# Patient Record
Sex: FEMALE | Race: WHITE | Employment: OTHER | URBAN - METROPOLITAN AREA
[De-identification: names, ages, dates, MRNs, and addresses within clinical notes are randomized per-mention and may not be internally consistent; named-entity substitution may affect disease eponyms.]

---

## 2019-06-21 ENCOUNTER — TRANSFERRED RECORDS (OUTPATIENT)
Dept: HEALTH INFORMATION MANAGEMENT | Facility: CLINIC | Age: 79
End: 2019-06-21

## 2019-09-09 ENCOUNTER — OFFICE VISIT (OUTPATIENT)
Dept: OPHTHALMOLOGY | Facility: CLINIC | Age: 79
End: 2019-09-09
Attending: OPHTHALMOLOGY
Payer: MEDICARE

## 2019-09-09 DIAGNOSIS — H20.11 CHRONIC ANTERIOR UVEITIS OF RIGHT EYE: Primary | ICD-10-CM

## 2019-09-09 DIAGNOSIS — H40.1130 PRIMARY OPEN ANGLE GLAUCOMA OF BOTH EYES, UNSPECIFIED GLAUCOMA STAGE: ICD-10-CM

## 2019-09-09 DIAGNOSIS — H25.12 AGE-RELATED NUCLEAR CATARACT OF LEFT EYE: ICD-10-CM

## 2019-09-09 DIAGNOSIS — Z96.1 PSEUDOPHAKIA OF RIGHT EYE: ICD-10-CM

## 2019-09-09 PROBLEM — I10 ESSENTIAL HYPERTENSION: Status: ACTIVE | Noted: 2017-10-16

## 2019-09-09 PROBLEM — I73.9 PAD (PERIPHERAL ARTERY DISEASE) (H): Status: ACTIVE | Noted: 2017-10-16

## 2019-09-09 PROCEDURE — 76513 OPH US DX ANT SGM US UNI/BI: CPT | Mod: ZF | Performed by: OPHTHALMOLOGY

## 2019-09-09 PROCEDURE — 92134 CPTRZ OPH DX IMG PST SGM RTA: CPT | Mod: ZF | Performed by: OPHTHALMOLOGY

## 2019-09-09 PROCEDURE — G0463 HOSPITAL OUTPT CLINIC VISIT: HCPCS | Mod: ZF

## 2019-09-09 RX ORDER — TRAVOPROST OPHTHALMIC SOLUTION 0.04 MG/ML
1 SOLUTION OPHTHALMIC AT BEDTIME
COMMUNITY
Start: 2018-05-01

## 2019-09-09 RX ORDER — CHLORAL HYDRATE 500 MG
1 CAPSULE ORAL
COMMUNITY

## 2019-09-09 RX ORDER — ACETAMINOPHEN 160 MG
2000 TABLET,DISINTEGRATING ORAL
COMMUNITY

## 2019-09-09 RX ORDER — NITROGLYCERIN 0.4 MG/1
TABLET SUBLINGUAL
COMMUNITY
Start: 2018-04-10

## 2019-09-09 RX ORDER — ISOSORBIDE MONONITRATE 30 MG/1
TABLET, EXTENDED RELEASE ORAL
COMMUNITY
Start: 2018-02-24

## 2019-09-09 RX ORDER — VALACYCLOVIR HYDROCHLORIDE 500 MG/1
500 TABLET, FILM COATED ORAL 3 TIMES DAILY
Qty: 42 TABLET | Refills: 1 | Status: SHIPPED | OUTPATIENT
Start: 2019-09-09 | End: 2019-10-14

## 2019-09-09 RX ORDER — VALACYCLOVIR HYDROCHLORIDE 500 MG/1
500 TABLET, FILM COATED ORAL 3 TIMES DAILY
Qty: 42 TABLET | Refills: 1 | Status: SHIPPED | OUTPATIENT
Start: 2019-09-09 | End: 2019-09-09

## 2019-09-09 RX ORDER — TRIAMCINOLONE ACETONIDE 1 MG/G
OINTMENT TOPICAL
COMMUNITY
Start: 2018-05-01

## 2019-09-09 RX ORDER — ACETAMINOPHEN 500 MG
1-2 TABLET ORAL
COMMUNITY

## 2019-09-09 RX ORDER — ALPRAZOLAM 0.25 MG
TABLET ORAL
Refills: 1 | COMMUNITY
Start: 2019-06-13

## 2019-09-09 RX ORDER — ASPIRIN 81 MG/1
81 TABLET ORAL
COMMUNITY
Start: 2015-12-02

## 2019-09-09 RX ORDER — ALPRAZOLAM 0.25 MG
TABLET ORAL
COMMUNITY
Start: 2018-03-03 | End: 2019-10-14

## 2019-09-09 RX ORDER — DORZOLAMIDE HYDROCHLORIDE AND TIMOLOL MALEATE 20; 5 MG/ML; MG/ML
SOLUTION/ DROPS OPHTHALMIC
Refills: 5 | COMMUNITY
Start: 2019-08-23 | End: 2019-10-14

## 2019-09-09 ASSESSMENT — GONIOSCOPY
OD_SUPERIOR: GRADE IV, NO PAS
OS_NASAL: GRADE IV, NO PAS
OS_INFERIOR: GRADE IV, NO PAS
OD_INFERIOR: GRADE IV, NO PAS
OS_TEMPORAL: GRADE IV, NO PAS
OS_SUPERIOR: GRADE IV, NO PAS
OD_NASAL: GRADE IV, NO PAS

## 2019-09-09 ASSESSMENT — VISUAL ACUITY
OD_PH_SC+: -2
METHOD: SNELLEN - LINEAR
OD_SC+: -1
OS_PH_SC+: -2
OS_PH_SC: 20/30
OS_SC+: -1
OD_SC: 20/40
OD_PH_SC: 20/30
OS_SC: 20/50

## 2019-09-09 ASSESSMENT — CONF VISUAL FIELD
METHOD: COUNTING FINGERS
OS_NORMAL: 1
OD_NORMAL: 1

## 2019-09-09 ASSESSMENT — EXTERNAL EXAM - RIGHT EYE: OD_EXAM: NORMAL

## 2019-09-09 ASSESSMENT — TONOMETRY
OS_IOP_MMHG: 19
OD_IOP_MMHG: 19
IOP_METHOD: TONOPEN

## 2019-09-09 ASSESSMENT — EXTERNAL EXAM - LEFT EYE: OS_EXAM: NORMAL

## 2019-09-09 ASSESSMENT — CUP TO DISC RATIO
OS_RATIO: 0.6
OD_RATIO: 0.6

## 2019-09-09 ASSESSMENT — SLIT LAMP EXAM - LIDS
COMMENTS: DERMATOCHALASIS
COMMENTS: DERMATOCHALASIS

## 2019-09-09 NOTE — PROGRESS NOTES
CC: Ms. Huynh is here for chronic iritis of the right eye.    History of Present Illness  The history began after cataract surgery in the right eye in May 2018. Per review of outside notes, this has been relatively responsive to topical Pred forte and Durezol, but recurs with tapering topical steroid. Prolensa also for short course. No oral steroid or antibiotic or antivirals have been used. No prior iritis before cataract surgery. Does get cold sores occasionally    Prior evaluation negative for Lyme and lupus.     Past Ocular History  Glaucoma each eye has been on Travatan for almost 20 years. SLT right eye around 2015 for IOP. There have been episodes of IOP elevation few occasions, most recently IOP 35 right eye few weeks ago. Started on Cosopt BID right eye.    Relevant Past Medical History   Health significant for prior myocardial infarction. Takes medications for blood pressure also for heart and medication for hyperlipidemia. 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     Did receive the older version of the Shingles Vaccine and one month ago had dose #1 of new Shingles vaccine (Shingrix). Due for second dose when Pharmacy has available. Did not have actual Shingles    Current review of systems: No fevers, rashes, joint pains, no night sweats.    Current eye related treatment: Dorzolamide-Timolol daily right eye,  Pred forte (was doing twice daily in right eye until two weeks ago), Travatan Z at bedtime each eye,    Uveitis Imaging   OCT Spectralis Macula September 9, 2019  right eye: Normal contour, no fluid  left eye: Normal contour nasally, inner retinal thinning temporal to fovea, no fluid.     Ultrasound Biomicroscopy September 9, 2019  right eye only: Normal IOL position. No haptic malposition    Assessment:  1. Chronic anterior uveitis of right eye  Mildly active although asymptomatic. IOL in good position on exam and gonioscopy. UBM confirms  appropriate position without affecting iris. No known shingles, but did have Shingrix recently. No major iris abnormalities, but does have cold sore history.    2. Pseudophakia of right eye  Well centered IOL     3. Primary open angle glaucoma of both eyes, unspecified glaucoma stage  IOP 19 each eye on Travatan at bedtime each eye and Timolol BID right eye      4. Age-related nuclear cataract of left eye  Mildly visually significant with some pseudoex on anterior capsule.      Discussion/Recommendations:  - With a normal gonioscopy and confirmatory anterior segment ultrasound biomicroscopy of the right eye, IOL induced causes such as Uveitis-Glaucoma Hyphema are essentially ruled out.   - While rare infectious causes of uveitis have not been ruled out (TB, Syphilis), these seem unlikely in this age group. Similarly, systemic inflammation associated unilateral anterior uveitis seem less likely. A more likely cause is chronic low grade viral infection (e.g. HSV-1 with elevated IOP, history of cold sores and unilateral nature), which has become manifest with reduced local immune response after cataract surgery.  - Discussed serologic vs aqueous testing via PCR vs therapeutic trial of oral antiviral Valtrex pill 500 mg three times daily for 2 weeks (Herpes Simplex dosing). Aqueous PCR would be most definitive test, but as minimal inflammation now, this seems less likely. Zoster would be even less likely given two prior vaccinations, no known cutaneous/Ocular Zoster and the relatively preserved iris architecture    Plan:   - Continue Travatan at bedtime each eye.Stop Dorzolamide-Timolol in the right eye  - Restart Pred acetate daily right eye  - Start Valtrex 500 mg/day for two weeks. This will start in one week, and exam will be performed one week after completing this therapy.  - Rec observe cataract left eye for now  - Additional Lab testing for causes of uveitis: Quantiferon, Syphilis testing    RTC 1 month (Monday,  October 14). AC Check, no dilation, testing      Physician Attestation     Attending Physician Attestation:  Complete documentation of historical and exam elements from today's encounter can be found in the full encounter summary report (not reduplicated in this progress note). I personally obtained the chief complaint(s) and history of present illness. I confirmed and edited as necessary the review of systems, past medical/surgical history, family history, social history, and examination findings as documented by others; and I examined the patient myself. I personally reviewed the relevant tests, images, and reports as documented above. I formulated and edited as necessary the assessment and plan and discussed the findings and management plan with the patient and family.   John Martinez M.D. September 9, 2019

## 2019-09-09 NOTE — NURSING NOTE
Chief Complaints and History of Present Illnesses   Patient presents with     Uveitis Evaluation     Chief Complaint(s) and History of Present Illness(es)     Uveitis Evaluation     Laterality: both eyes    Onset: gradual    Onset: months ago    Quality: Unsure if the va has changed     Frequency: constantly    Associated symptoms: dryness, redness, eye pain (has decreased), floaters and flashes    Pain scale: 0/10              Comments     Had CE/IOL last may 2018 and since then she has had a flare of Iritis.  Has been off the PF for the past 2 weeks and has not had any flares since  Shalini Shepherd COT 12:08 PM September 9, 2019

## 2019-09-09 NOTE — PATIENT INSTRUCTIONS
Please Start Valtrex pill  500 mg three times daily for 2 weeks  - Restart Pred acetate daily right eye  - Continue Travatan at bedtime each eye.  - Stop Dorzolamide-Timolol in the right eye

## 2019-09-09 NOTE — LETTER
9/9/2019       RE: Valentina Huynh  [address]  [facility] [address]     Dear Colleague:    Thank you for referring your patient, Valentina Huynh, to the EYE CLINIC at [facility]. Please see a copy of my visit note below.    CC: Ms. Huynh is here for chronic iritis of the right eye.    History of Present Illness  The history began after cataract surgery in the right eye in May 2018. Per review of outside notes, this has been relatively responsive to topical Pred forte and Durezol, but recurs with tapering topical steroid. Prolensa also for short course. No oral steroid or antibiotic or antivirals have been used. No prior iritis before cataract surgery. Does get cold sores occasionally    Prior evaluation negative for Lyme and lupus.     Past Ocular History  Glaucoma each eye has been on Travatan for almost 20 years. SLT right eye around 2015 for IOP. There have been episodes of IOP elevation few occasions, most recently IOP 35 right eye few weeks ago. Started on Cosopt BID right eye.    Relevant Past Medical History   Health significant for prior myocardial infarction. Takes medications for blood pressure also for heart and medication for hyperlipidemia. 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     Did receive the older version of the Shingles Vaccine and one month ago had dose #1 of new Shingles vaccine (Shingrix). Due for second dose when Pharmacy has available. Did not have actual Shingles    Current review of systems: No fevers, rashes, joint pains, no night sweats.    Current eye related treatment: Dorzolamide-Timolol daily right eye,  Pred forte (was doing twice daily in right eye until two weeks ago), Travatan Z at bedtime each eye,    Uveitis Imaging   OCT Spectralis Macula September 9, 2019  right eye: Normal contour, no fluid  left eye: Normal contour nasally, inner retinal thinning temporal to fovea, no fluid.      Ultrasound Biomicroscopy September 9, 2019  right eye only: Normal IOL position. No haptic malposition    Assessment:  1. Chronic anterior uveitis of right eye  Mildly active although asymptomatic. IOL in good position on exam and gonioscopy. UBM confirms appropriate position without affecting iris. No known shingles, but did have Shingrix recently. No major iris abnormalities, but does have cold sore history.    2. Pseudophakia of right eye  Well centered IOL     3. Primary open angle glaucoma of both eyes, unspecified glaucoma stage  IOP 19 each eye on Travatan at bedtime each eye and Timolol BID right eye      4. Age-related nuclear cataract of left eye  Mildly visually significant with some pseudoex on anterior capsule.      Discussion/Recommendations:  - With a normal gonioscopy and confirmatory anterior segment ultrasound biomicroscopy of the right eye, IOL induced causes such as Uveitis-Glaucoma Hyphema are essentially ruled out.   - While rare infectious causes of uveitis have not been ruled out (TB, Syphilis), these seem unlikely in this age group. Similarly, systemic inflammation associated unilateral anterior uveitis seem less likely. A more likely cause is chronic low grade viral infection (e.g. HSV-1 with elevated IOP, history of cold sores and unilateral nature), which has become manifest with reduced local immune response after cataract surgery.  - Discussed serologic vs aqueous testing via PCR vs therapeutic trial of oral antiviral Valtrex pill 500 mg three times daily for 2 weeks (Herpes Simplex dosing). Aqueous PCR would be most definitive test, but as minimal inflammation now, this seems less likely. Zoster would be even less likely given two prior vaccinations, no known cutaneous/Ocular Zoster and the relatively preserved iris architecture    Plan:   - Continue Travatan at bedtime each eye.Stop Dorzolamide-Timolol in the right eye  - Restart Pred acetate daily right eye  - Start Valtrex 500  mg/day for two weeks. This will start in one week, and exam will be performed one week after completing this therapy.  - Rec observe cataract left eye for now  - Additional Lab testing for causes of uveitis: Quantiferon, Syphilis testing    RTC 1 month (Monday, October 14). AC Check, no dilation, testing    Physician Attestation     I, John Martinez MD, reviewed the chief complaint, history of present illness, past ocular history, and relevant allergies, medications and past medical/surgical/family history as well as an appropriate review of systems. I have performed and independent history, physical examination and evaluated this patient personally. I agree with the assessment and plan as documented above.    Again, thank you for allowing me to participate in the care of your patient.      Sincerely,    John Martinez MD  Uveitis and Medical Retina

## 2019-09-11 ENCOUNTER — TRANSFERRED RECORDS (OUTPATIENT)
Dept: HEALTH INFORMATION MANAGEMENT | Facility: CLINIC | Age: 79
End: 2019-09-11

## 2019-10-14 ENCOUNTER — OFFICE VISIT (OUTPATIENT)
Dept: OPHTHALMOLOGY | Facility: CLINIC | Age: 79
End: 2019-10-14
Attending: OPHTHALMOLOGY
Payer: MEDICARE

## 2019-10-14 DIAGNOSIS — H40.1130 PRIMARY OPEN ANGLE GLAUCOMA OF BOTH EYES, UNSPECIFIED GLAUCOMA STAGE: ICD-10-CM

## 2019-10-14 DIAGNOSIS — Z96.1 PSEUDOPHAKIA OF RIGHT EYE: ICD-10-CM

## 2019-10-14 DIAGNOSIS — H25.12 AGE-RELATED NUCLEAR CATARACT OF LEFT EYE: ICD-10-CM

## 2019-10-14 DIAGNOSIS — H20.11 CHRONIC ANTERIOR UVEITIS OF RIGHT EYE: Primary | ICD-10-CM

## 2019-10-14 PROCEDURE — G0463 HOSPITAL OUTPT CLINIC VISIT: HCPCS | Mod: ZF

## 2019-10-14 RX ORDER — KETOROLAC TROMETHAMINE 5 MG/ML
1 SOLUTION OPHTHALMIC 2 TIMES DAILY
Qty: 5 ML | Refills: 3 | Status: SHIPPED | OUTPATIENT
Start: 2019-10-14

## 2019-10-14 RX ORDER — PREDNISOLONE ACETATE 10 MG/ML
1 SUSPENSION/ DROPS OPHTHALMIC DAILY PRN
Refills: 0 | COMMUNITY
Start: 2019-08-23 | End: 2019-10-14

## 2019-10-14 RX ORDER — PREDNISOLONE ACETATE 10 MG/ML
1 SUSPENSION/ DROPS OPHTHALMIC 2 TIMES DAILY
Qty: 5 ML | Refills: 3 | Status: SHIPPED | OUTPATIENT
Start: 2019-10-14

## 2019-10-14 ASSESSMENT — CONF VISUAL FIELD
OS_NORMAL: 1
OD_NORMAL: 1
METHOD: COUNTING FINGERS

## 2019-10-14 ASSESSMENT — CUP TO DISC RATIO
OS_RATIO: 0.5
OD_RATIO: 0.7

## 2019-10-14 ASSESSMENT — VISUAL ACUITY
METHOD: SNELLEN - LINEAR
OS_SC: 20/60
OD_PH_SC+: +1
OD_SC: 20/40
OD_SC+: -1
OS_PH_SC: 20/40
OS_SC+: +2
OD_PH_SC: 20/30
OS_PH_SC+: +2

## 2019-10-14 ASSESSMENT — TONOMETRY
OD_IOP_MMHG: 19
OS_IOP_MMHG: 14
IOP_METHOD: ICARE

## 2019-10-14 ASSESSMENT — EXTERNAL EXAM - RIGHT EYE: OD_EXAM: PERIORBITAL ERYTHEMA

## 2019-10-14 ASSESSMENT — SLIT LAMP EXAM - LIDS
COMMENTS: NORMAL
COMMENTS: NORMAL

## 2019-10-14 ASSESSMENT — EXTERNAL EXAM - LEFT EYE: OS_EXAM: PERIORBITAL ERYTHEMA

## 2019-10-14 NOTE — NURSING NOTE
Chief Complaints and History of Present Illnesses   Patient presents with     Follow Up     1 month follow up Chronic anterior uveitis of right eye     Chief Complaint(s) and History of Present Illness(es)     Follow Up     Comments: 1 month follow up Chronic anterior uveitis of right eye              Comments     Pt states vision is about the same as last visit. No eye pain today. No flashes or floaters.  No redness. Dryness in both eyes upon waking, relief with drops.    SARTHAK Sutton  October 14, 2019 9:40 AM

## 2019-10-14 NOTE — PROGRESS NOTES
Chief Complaint/Presenting Concern:  Ms. Huynh is here to follow up on her chronic anterior uveitis of the right eye.     Interval Ocular History of Present Illness: At last visit on 9/9/19, Ms. Huynh was identified as having chronic anterior uveitis of the right eye. With a negative anterior segment ultrasound and history of IOP elevations, a therapeutic oral anti-viral trial was instituted. As instructed, Ms. Huynh waited one week and started oral Valtrex 500 mg three times daily as instructed for two weeks. She used the pill two or three times daily until she completed the entire course while also using Pred Acetate daily right eye.     Since that time, Ms. Huynh reported one episode of redness and pain which improved by using the Pred Forte twice daily. The Valtrex was well tolerated.    Interval Updates to Medical/Family/Social History:  No new medical updates.     Relevant Review of Systems Updates:  No new updates, no stomach discomfort with Valtrex.    Laboratory Testing September 2019: Negative Syphilis and Quantiferon.    Current eye related medications:  Travatan at bedtime each eye, Pred Forte daily right eye only (off Valtrex for one week).     Retina/Uveitis Imaging: None today    Assessment:     1. Chronic anterior uveitis of right eye  Slightly more inflammation even with antiviral trial of Valtrex 500 mg TID x 14 days. There was also a flare noted by patient which goes along with reduced Pred forte.    2. Pseudophakia of right eye  Stable without IOL deposits    3. Primary open angle glaucoma of both eyes, unspecified glaucoma stage  IOP 19, 14 on Travatan Z alone even off Cosopt since last visit. IOP not lowered    4. Age-related nuclear cataract of left eye  Visually significant but surgery being deferred until inflammation right eye stable/resolved    Plan/Recommendations:      Additional diagnostic testing: None at this time    Current medications: Increase Pred Forte to BID right eye. The most  common causes of anterior uveitis have been ruled out at this point, so it may be that there may be some dependence on low frequency corticosteroid for the time being. The oral anti-viral trial did not provide improvement in or resolution of the anterior uveitis    New medications: Add Ketorolac BID right eye which may help provide additional anti-inflammatory control. No Cosopt needed right eye     Return to Dr. Arevalo in late November 2019. If the Pred Acetate BID and Ketorolac BID can be used to keep the inflammation stable, this may be a tolerable long term regimen. Oral antivirals unlikely to provide benefit. Return here PRN.     RTC Dr. Martinez PRN. IOP, no dilation, no testing necessary    Physician Attestation     Attending Physician Attestation:  Complete documentation of historical and exam elements from today's encounter can be found in the full encounter summary report (not reduplicated in this progress note). I personally obtained the chief complaint(s) and history of present illness. I confirmed and edited as necessary the review of systems, past medical/surgical history, family history, social history, and examination findings as documented by others; and I examined the patient myself. I personally reviewed the relevant tests, images, and reports as documented above. I formulated and edited as necessary the assessment and plan and discussed the findings and management plan with the patient and family.    John Martinez M.D., Uveitis and Medical Retina, October 14, 2019

## 2019-10-14 NOTE — LETTER
10/14/2019    RE: Valentina Huynh  [address] [address] [place] [id number]     Dear Colleague,    Thank you for the continued care of your patient, Valentina Huynh. Please see a copy of my visit note:    Chief Complaint/Presenting Concern:  Ms. Huynh is here to follow up on her chronic anterior uveitis of the right eye.     Interval Ocular History of Present Illness: At last visit on 9/9/19, Ms. Huynh was identified as having chronic anterior uveitis of the right eye. With a negative anterior segment ultrasound and history of IOP elevations, a therapeutic oral anti-viral trial was instituted. As instructed, Ms. Huynh waited one week and started oral Valtrex 500 mg three times daily as instructed for two weeks. She used the pill two or three times daily until she completed the entire course while also using Pred Acetate daily right eye.     Since that time, Ms. Huynh reported one episode of redness and pain which improved by using the Pred Forte twice daily. The Valtrex was well tolerated.    Interval Updates to Medical/Family/Social History:  No new medical updates.     Relevant Review of Systems Updates:  No new updates, no stomach discomfort with Valtrex.    Laboratory Testing September 2019: Negative Syphilis and Quantiferon.    Current eye related medications:  Travatan at bedtime each eye, Pred Forte daily right eye only (off Valtrex for one week).     Retina/Uveitis Imaging: None today    Assessment:     1. Chronic anterior uveitis of right eye  Slightly more inflammation even with antiviral trial of Valtrex 500 mg TID x 14 days. There was also a flare noted by patient which goes along with reduced Pred forte.    2. Pseudophakia of right eye  Stable without IOL deposits    3. Primary open angle glaucoma of both eyes, unspecified glaucoma stage  IOP 19, 14 on Travatan Z alone even off Cosopt since last visit. IOP not lowered    4. Age-related nuclear cataract of left eye  Visually significant but surgery being  deferred until inflammation right eye stable/resolved    Plan/Recommendations:      Additional diagnostic testing: None at this time    Current medications: Increase Pred Forte to BID right eye. The most common causes of anterior uveitis have been ruled out at this point, so it may be that there may be some dependence on low frequency corticosteroid for the time being. The oral anti-viral trial did not provide improvement in or resolution of the anterior uveitis    New medications: Add Ketorolac BID right eye which may help provide additional anti-inflammatory control. No Cosopt needed right eye     Return to Dr. Arevalo in late November 2019. If the Pred Acetate BID and Ketorolac BID can be used to keep the inflammation stable, this may be a tolerable long term regimen. Oral antivirals unlikely to provide benefit. Return here PRN.     RTC Dr. Martinez PRN. IOP, no dilation, no testing necessary    Physician Attestation     Attending Physician Attestation:  Complete documentation of historical and exam elements from today's encounter can be found in the full encounter summary report (not reduplicated in this progress note). I personally obtained the chief complaint(s) and history of present illness. I confirmed and edited as necessary the review of systems, past medical/surgical history, family history, social history, and examination findings as documented by others; and I examined the patient myself. I personally reviewed the relevant tests, images, and reports as documented above. I formulated and edited as necessary the assessment and plan and discussed the findings and management plan with the patient and family.    John Martinez M.D., Uveitis and Medical Retina, October 14, 2019       John Martinez MD  [facility] Dept of Ophthalmology  Uveitis and Medical Retina

## 2019-10-14 NOTE — PATIENT INSTRUCTIONS
Continue the Travatan at bedtime in both eyes    You don't have to take the virus pill any longer    We can continue the Prednisolone 2x/day in the right eye and add this new medication called Ketorolac 2x/day in the right eye     You can keep the appointment with Dr Arevalo in November and we will see you back anytime

## 2022-02-17 PROBLEM — H20.11 CHRONIC ANTERIOR UVEITIS OF RIGHT EYE: Status: ACTIVE | Noted: 2019-09-09
